# Patient Record
Sex: FEMALE | Race: WHITE | Employment: FULL TIME | URBAN - METROPOLITAN AREA
[De-identification: names, ages, dates, MRNs, and addresses within clinical notes are randomized per-mention and may not be internally consistent; named-entity substitution may affect disease eponyms.]

---

## 2017-08-02 ENCOUNTER — ALLSCRIPTS OFFICE VISIT (OUTPATIENT)
Dept: OTHER | Facility: OTHER | Age: 62
End: 2017-08-02

## 2017-08-02 DIAGNOSIS — Z12.31 ENCOUNTER FOR SCREENING MAMMOGRAM FOR MALIGNANT NEOPLASM OF BREAST: ICD-10-CM

## 2017-10-12 ENCOUNTER — GENERIC CONVERSION - ENCOUNTER (OUTPATIENT)
Dept: OTHER | Facility: OTHER | Age: 62
End: 2017-10-12

## 2018-01-12 VITALS — DIASTOLIC BLOOD PRESSURE: 60 MMHG | SYSTOLIC BLOOD PRESSURE: 118 MMHG

## 2018-05-02 ENCOUNTER — TELEPHONE (OUTPATIENT)
Dept: OBGYN CLINIC | Facility: MEDICAL CENTER | Age: 63
End: 2018-05-02

## 2018-05-02 NOTE — TELEPHONE ENCOUNTER
Pt  Requesting copy of documentation that notes when bladder prolapse was first discovered    Visit from 11/2015 located in old allscript system and sent to patients home per request

## 2018-08-13 RX ORDER — DIAZEPAM 10 MG/2ML
10 GEL RECTAL
COMMUNITY
End: 2019-08-15 | Stop reason: ALTCHOICE

## 2018-08-13 RX ORDER — AMOXICILLIN 250 MG
1 CAPSULE ORAL
COMMUNITY
Start: 2018-04-04 | End: 2019-08-15 | Stop reason: ALTCHOICE

## 2018-08-13 RX ORDER — HYDROCODONE BITARTRATE AND ACETAMINOPHEN 5; 325 MG/1; MG/1
1 TABLET ORAL EVERY 4 HOURS PRN
COMMUNITY
Start: 2018-04-18 | End: 2019-08-15 | Stop reason: ALTCHOICE

## 2018-08-13 RX ORDER — GABAPENTIN 100 MG/1
100 CAPSULE ORAL 4 TIMES DAILY
Refills: 0 | COMMUNITY
Start: 2018-06-27

## 2018-08-13 RX ORDER — ALENDRONATE SODIUM 70 MG/1
TABLET ORAL
Refills: 0 | COMMUNITY
Start: 2018-06-18

## 2018-08-13 RX ORDER — TROSPIUM CHLORIDE ER 60 MG/1
60 CAPSULE ORAL DAILY
Refills: 0 | COMMUNITY
Start: 2018-06-20 | End: 2019-08-15 | Stop reason: ALTCHOICE

## 2018-08-14 ENCOUNTER — ANNUAL EXAM (OUTPATIENT)
Dept: OBGYN CLINIC | Facility: MEDICAL CENTER | Age: 63
End: 2018-08-14
Payer: COMMERCIAL

## 2018-08-14 VITALS
WEIGHT: 126 LBS | SYSTOLIC BLOOD PRESSURE: 132 MMHG | DIASTOLIC BLOOD PRESSURE: 86 MMHG | HEIGHT: 62 IN | BODY MASS INDEX: 23.19 KG/M2

## 2018-08-14 DIAGNOSIS — Z01.419 ENCNTR FOR GYN EXAM (GENERAL) (ROUTINE) W/O ABN FINDINGS: Primary | ICD-10-CM

## 2018-08-14 DIAGNOSIS — Z12.31 ENCOUNTER FOR SCREENING MAMMOGRAM FOR MALIGNANT NEOPLASM OF BREAST: ICD-10-CM

## 2018-08-14 DIAGNOSIS — Z12.4 ENCOUNTER FOR PAP SMEAR OF CERVIX WITH HPV DNA COTESTING: ICD-10-CM

## 2018-08-14 PROBLEM — E78.00 HYPERCHOLESTEROLEMIA: Status: ACTIVE | Noted: 2017-12-19

## 2018-08-14 PROBLEM — K52.9 GASTROENTERITIS: Status: ACTIVE | Noted: 2018-04-15

## 2018-08-14 PROBLEM — N32.9 DISORDER OF BLADDER: Status: ACTIVE | Noted: 2017-12-19

## 2018-08-14 PROBLEM — G47.30 SLEEP APNEA: Status: ACTIVE | Noted: 2017-12-19

## 2018-08-14 PROBLEM — C73 PAPILLARY THYROID CARCINOMA (HCC): Status: ACTIVE | Noted: 2018-08-14

## 2018-08-14 PROBLEM — E16.2 HYPOGLYCEMIA: Status: ACTIVE | Noted: 2017-12-19

## 2018-08-14 PROBLEM — M81.0 OSTEOPOROSIS: Status: ACTIVE | Noted: 2017-08-01

## 2018-08-14 PROBLEM — Z91.09 ENVIRONMENTAL ALLERGIES: Status: ACTIVE | Noted: 2017-12-19

## 2018-08-14 PROBLEM — R10.2 PAIN, FEMALE PELVIC: Status: ACTIVE | Noted: 2017-08-02

## 2018-08-14 PROCEDURE — 87624 HPV HI-RISK TYP POOLED RSLT: CPT | Performed by: OBSTETRICS & GYNECOLOGY

## 2018-08-14 PROCEDURE — S0612 ANNUAL GYNECOLOGICAL EXAMINA: HCPCS | Performed by: OBSTETRICS & GYNECOLOGY

## 2018-08-14 PROCEDURE — G0145 SCR C/V CYTO,THINLAYER,RESCR: HCPCS | Performed by: OBSTETRICS & GYNECOLOGY

## 2018-08-14 RX ORDER — CIPROFLOXACIN 250 MG/1
250 TABLET, FILM COATED ORAL EVERY 12 HOURS SCHEDULED
COMMUNITY

## 2018-08-14 RX ORDER — ESTRADIOL 0.1 MG/G
2 CREAM VAGINAL 2 TIMES WEEKLY
COMMUNITY
End: 2020-08-27

## 2018-08-14 NOTE — PROGRESS NOTES
ASSESSMENT & PLAN: Adam Charles was seen today for gynecologic exam     Diagnoses and all orders for this visit:    Encntr for gyn exam (general) (routine) w/o abn findings    Encounter for Pap smear of cervix with HPV DNA cotesting  -     Cancel: Liquid-based pap, screening  -     Liquid-based pap, screening    Encounter for screening mammogram for malignant neoplasm of breast    Discussion/Summary; reviewed specifics of her surgery in [place], [place]  ( a referral from Dr Kael Ji); will heed pap and HPV)    1  Routine well woman exam done today  2  Pap and HPV:  The patient's pap is not up to date  Pap and cotesting was done today  Current ASCCP Guidelines reviewed  3   Mammogram was ordered  4  Colonoscopy is up to date  4  The following were reviewed in today's visit: breast self exam   5  F/u 1 year      CC:  Annual Gynecologic Examination    HPI: Melvi Mcfarland is a 59 y o  who presents for annual gynecologic examination  She has the following concerns:  Bladder to have Botox soon    Health Maintenance:    Patient describes her health as good  Patient does not have weight concerns  She exercises 7 days per week with walking  She doeswears her seatbelt routinely  She does perform regular monthly self breast exams  She does feel safe at home  Patients does not follow a  diet  Patient gets 4 servings of dairy or calcium rich foods a day      Last pap: 2015  Last mammogram: 2017  Last colonoscopy: 2016    Patient Active Problem List   Diagnosis    Pelvic pain    H/O vaginal surgery    Disorder of bladder    Environmental allergies    Gastroenteritis    Hypercholesterolemia    Hypoglycemia    Incontinence of urine in female    Osteoporosis    Osteopenia    Pain, female pelvic    Papillary thyroid carcinoma (HCC)    Postoperative hypothyroidism    Sleep apnea       Past Medical History:   Diagnosis Date    Cancer ([facility] [address] )     b cell lymphoma, thyroid cancer    CPAP (continuous positive airway pressure) dependence     Environmental allergies     Hyperlipidemia     Incontinence of urine in female     esolved :7/18/2016    Malignant neoplasm of thyroid gland ([address] )     Osteoporosis     Reactive hypoglycemia     Sleep apnea        Past Surgical History:   Procedure Laterality Date    DIAGNOSTIC LAPAROSCOPY      for endometriosis    KIDNEY STONE SURGERY      [place] REVISION VAGINAL GRAFT N/A 12/22/2016    Procedure: REVISION OF GRAFT; PUDENDAL NERVE RELEASE;  Surgeon: Bill Conde MD;  Location: [facility];  Service: UroGynecology           THYROIDECTOMY      TONSILLECTOMY         Past OB/Gyn History:    History of abnormal pap smears: No    Patient is currently sexually active  monogamous    Patient's family planning method is post menopausal status  Family History   Problem Relation Age of Onset    Breast cancer Other        Social History:  Social History     Social History    Marital status: /Civil Union     Spouse name: N/A    Number of children: N/A    Years of education: N/A     Occupational History    Not on file  Social History Main Topics    Smoking status: Never Smoker    Smokeless tobacco: Never Used    Alcohol use No    Drug use: No    Sexual activity: Not on file     Other Topics Concern    Not on file     Social History Narrative    No narrative on file     Presently lives with   Patient is currently employed       Allergies   Allergen Reactions    Aromatic Oils Headache    Aspirin     Dust Mite Mixed Allergen Ext  [Mite (D  Farinae)] Headache    Hydrocodone-Acetaminophen GI Intolerance and Nausea Only    Molds & Smuts Headache    Nicotiana Tabacum Headache    Tree Extract Headache    Erythromycin Other (See Comments)     Vomiting          Current Outpatient Prescriptions:     alendronate (FOSAMAX) 70 mg tablet, , Disp: , Rfl: 0    ciprofloxacin (CIPRO) 250 mg tablet, Take 250 mg by mouth every 12 (twelve) hours, Disp: , Rfl:     clonazePAM (KlonoPIN) 1 mg tablet, Take 1 mg by mouth daily at bedtime as needed for seizures, Disp: , Rfl:     Docusate Sodium (RA COL-RITE PO), Take 1 tablet by mouth daily, Disp: , Rfl:     estradiol (ESTRACE) 0 1 mg/g vaginal cream, Insert 2 g into the vagina 2 (two) times a week, Disp: , Rfl:     gabapentin (NEURONTIN) 100 mg capsule, 100 mg 4 (four) times a day  , Disp: , Rfl: 0    Levothyroxine Sodium 88 MCG CAPS, Take 88 mcg by mouth daily, Disp: , Rfl:     senna-docusate sodium (SENOKOT S) 8 6-50 mg per tablet, Take 1 tablet by mouth, Disp: , Rfl:     simvastatin (ZOCOR) 40 mg tablet, Take 40 mg by mouth daily at bedtime, Disp: , Rfl:     diazepam (DIASTAT) 10 mg, 10 mg, Disp: , Rfl:     HYDROcodone-acetaminophen (NORCO) 5-325 mg per tablet, Take 1 tablet by mouth every 4 (four) hours as needed, Disp: , Rfl:     nitrofurantoin (MACRODANTIN) 50 mg capsule, Take 50 mg by mouth daily at bedtime, Disp: , Rfl:     Ospemifene 60 MG TABS, Take 60 mg by mouth, Disp: , Rfl:     solifenacin (VESICARE) 10 MG tablet, Take 10 mg by mouth daily, Disp: , Rfl:     trospium (SANCTURA XR) 60 mg 24 hr capsule, Take 60 mg by mouth daily, Disp: , Rfl: 0    Review of Systems  Constitutional :no fever, feels well, no tiredness, no recent weight gain or loss  ENT: no ear ache, no loss of hearing, no nosebleeds or nasal discharge, no sore throat or hoarseness  Cardiovascular: no complaints of slow or fast heart beat, no chest pain, no palpitations, no leg claudication or lower extremity edema    Respiratory: no complaints of shortness of shortness of breath, no NASCIMENTO  Breasts:no complaints of breast pain, breast lump, or nipple discharge  Gastrointestinal: no complaints of abdominal pain, constipation, nausea, vomiting, or diarrhea or bloody stools  Genitourinary : no complaints of dysuria, incontinence, pelvic pain, no dysmenorrhea, vaginal discharge or abnormal vaginal bleeding and as noted in HPI   Musculoskeletal: no complaints of arthralgia, no myalgia, no joint swelling or stiffness, no limb pain or swelling  Integumentary: no complaints of skin rash or lesion, itching or dry skin  Neurological: no complaints of headache, no confusion, no numbness or tingling, no dizziness or fainting    Physical Exam:   /86 (BP Location: Right arm, Patient Position: Sitting, Cuff Size: Standard)   Ht 5' 2" (1 575 m)   Wt 57 2 kg (126 lb)   BMI 23 05 kg/m²     General:   appears stated age, cooperative, alert normal mood and affect   Psychiatric oriented to person, place and time  Mood and affect normal   Neck: normal, supple,trachea midline, no masses  Thyroid: normal, no thyromegaly   Heart: regular rate and rhythm, S1, S2 normal, no murmur, click, rub or gallop   Lungs: clear to auscultation bilaterally, no increased work of breathing or signs of respiratory distress   Breasts: normal, no dimpling or skin changes noted   Abdomen: soft, non-tender, without masses or organomegaly   Vulva: normal , no lesions   Vagina: normal , no lesions or dryness   Urethra: normal   Urethal meatus normal   Bladder Normal, soft, non-tender and no prolapse or masses appreciated   Cervix: normal, no palpable masses ; ec and c pap and HPV culture done   Uterus: normal , non-tender, not enlarged, no palpable masses   Adnexa: normal, non-tender without fullness or masses; hemoccult neg  Lymphatic Palpation of lymph nodes in neck, axilla, groin and/or other locations: no lymphadenopathy or masses noted   Skin Normal skin turgor and no rashes    Palpation of skin and subcutaneous tissue normal

## 2018-08-16 LAB
HPV HR 12 DNA CVX QL NAA+PROBE: NEGATIVE
HPV16 DNA CVX QL NAA+PROBE: NEGATIVE
HPV18 DNA CVX QL NAA+PROBE: NEGATIVE

## 2018-08-17 LAB
LAB AP GYN PRIMARY INTERPRETATION: NORMAL
Lab: NORMAL

## 2019-08-15 ENCOUNTER — ANNUAL EXAM (OUTPATIENT)
Dept: OBGYN CLINIC | Facility: MEDICAL CENTER | Age: 64
End: 2019-08-15
Payer: COMMERCIAL

## 2019-08-15 VITALS
HEIGHT: 62 IN | DIASTOLIC BLOOD PRESSURE: 80 MMHG | WEIGHT: 126.5 LBS | BODY MASS INDEX: 23.28 KG/M2 | SYSTOLIC BLOOD PRESSURE: 122 MMHG

## 2019-08-15 DIAGNOSIS — Z01.419 ENCNTR FOR GYN EXAM (GENERAL) (ROUTINE) W/O ABN FINDINGS: Primary | ICD-10-CM

## 2019-08-15 DIAGNOSIS — Z12.31 ENCOUNTER FOR SCREENING MAMMOGRAM FOR MALIGNANT NEOPLASM OF BREAST: ICD-10-CM

## 2019-08-15 PROCEDURE — S0612 ANNUAL GYNECOLOGICAL EXAMINA: HCPCS | Performed by: OBSTETRICS & GYNECOLOGY

## 2019-08-15 RX ORDER — METHENAMINE, SODIUM PHOSPHATE, MONOBASIC, METHYLENE BLUE, AND HYOSCYAMINE SULFATE 81.6; 40.8; 10.8; .12 MG/1; MG/1; MG/1; MG/1
TABLET, COATED ORAL
COMMUNITY
Start: 2018-12-26

## 2019-08-15 RX ORDER — LEVOTHYROXINE SODIUM 0.07 MG/1
TABLET ORAL
COMMUNITY
Start: 2019-08-15

## 2019-08-15 NOTE — PROGRESS NOTES
ASSESSMENT & PLAN: Idalia Cunningham was seen today for gynecologic exam     Diagnoses and all orders for this visit:    Encntr for gyn exam (general) (routine) w/o abn findings    Encounter for screening mammogram for malignant neoplasm of breast  -     Mammo screening bilateral w 3d & cad; Future    Discussion/Summary:  Patient here for yearly gyn preventive exam, accompanied in the room by her ,  Ronnie Penny  1   Routine well woman exam done today  2  Pap and HPV:  The patient's pap is up to date  Pap and cotesting was not done today  Current ASCCP Guidelines reviewed  3   Mammogram was ordered  4  Colonoscopy is up to date  4  The following were reviewed in today's visit: breast self exam, adequate intake of calcium and vitamin D, exercise and healthy diet  5  F/u 1 year  CC:  Annual Gynecologic Examination    HPI: Etelvina Howard is a 62 y o  who presents for annual gynecologic examination  She has the following concerns:  Bladder concerns being cared for by Dr Rhonda Schroeder  Health Maintenance:    Patient describes her health as good  Patient does not have weight concerns  She exercises 7 days per week with walking  She does wear her seatbelt routinely  She does perform regular monthly self breast exams  She does feel safe at home  Patients does not follow a  diet  Patient gets 5 servings of dairy or calcium rich foods a day      Last pap: 2017  Last mammogram: 2019  Last colonoscopy: unk own date    Patient Active Problem List   Diagnosis    Pelvic pain    H/O vaginal surgery    Disorder of bladder    Environmental allergies    Gastroenteritis    Hypercholesterolemia    Hypoglycemia    Incontinence of urine in female    Osteoporosis    Osteopenia    Pain, female pelvic    Papillary thyroid carcinoma (HCC)    Postoperative hypothyroidism    Sleep apnea       Past Medical History:   Diagnosis Date    Cancer ([facility] [address] )     b cell lymphoma, thyroid cancer    CPAP (continuous positive airway pressure) dependence     Environmental allergies     Hyperlipidemia     Incontinence of urine in female     esolved :7/18/2016    Malignant neoplasm of thyroid gland ([address] )     Osteoporosis     Reactive hypoglycemia     Sleep apnea        Past Surgical History:   Procedure Laterality Date    DIAGNOSTIC LAPAROSCOPY      for endometriosis    KIDNEY STONE SURGERY      [place] REVISION VAGINAL GRAFT N/A 12/22/2016    Procedure: REVISION OF GRAFT; PUDENDAL NERVE RELEASE;  Surgeon: Aspen Hoff MD;  Location: [facility];  Service: UroGynecology           THYROIDECTOMY      TONSILLECTOMY         Past OB/Gyn History:    History of abnormal pap smears: No    Patient is currently sexually active  monogamous   Patient's family planning method is post menopausal status      Family History   Problem Relation Age of Onset    Breast cancer Other        Social History:  Social History     Socioeconomic History    Marital status: /Civil Union     Spouse name: Not on file    Number of children: Not on file    Years of education: Not on file    Highest education level: Not on file   Occupational History    Not on file   Social Needs    Financial resource strain: Not on file    Food insecurity:     Worry: Not on file     Inability: Not on file    Transportation needs:     Medical: Not on file     Non-medical: Not on file   Tobacco Use    Smoking status: Never Smoker    Smokeless tobacco: Never Used   Substance and Sexual Activity    Alcohol use: No    Drug use: No    Sexual activity: Not on file   Lifestyle    Physical activity:     Days per week: Not on file     Minutes per session: Not on file    Stress: Not on file   Relationships    Social connections:     Talks on phone: Not on file     Gets together: Not on file     Attends Rastafarian service: Not on file     Active member of club or organization: Not on file     Attends meetings of clubs or organizations: Not on file     Relationship status: Not on file    Intimate partner violence:     Fear of current or ex partner: Not on file     Emotionally abused: Not on file     Physically abused: Not on file     Forced sexual activity: Not on file   Other Topics Concern    Not on file   Social History Narrative    Not on file     Presently lives with   Patient is currently retired   Allergies   Allergen Reactions    Aromatic Oils Headache    Aspirin     Doxycycline      Painful rash    Dust Mite Mixed Allergen Ext  [Mite (D  Farinae)] Headache    Hydrocodone-Acetaminophen GI Intolerance and Nausea Only    Molds & Smuts Headache    Nicotiana Tabacum Headache    Tree Extract Headache    Erythromycin Other (See Comments)     Vomiting          Current Outpatient Medications:     Methen-Hyosc-Meth Blue-Na Phos (UROGESIC-BLUE) 81 6 MG TABS, , Disp: , Rfl:     alendronate (FOSAMAX) 70 mg tablet, , Disp: , Rfl: 0    ciprofloxacin (CIPRO) 250 mg tablet, Take 250 mg by mouth every 12 (twelve) hours, Disp: , Rfl:     clonazePAM (KlonoPIN) 1 mg tablet, Take 1 mg by mouth daily at bedtime as needed for seizures, Disp: , Rfl:     Docusate Sodium (RA COL-RITE PO), Take 1 tablet by mouth daily, Disp: , Rfl:     estradiol (ESTRACE) 0 1 mg/g vaginal cream, Insert 2 g into the vagina 2 (two) times a week, Disp: , Rfl:     gabapentin (NEURONTIN) 100 mg capsule, 100 mg 4 (four) times a day  , Disp: , Rfl: 0    levothyroxine 75 mcg tablet, , Disp: , Rfl:     simvastatin (ZOCOR) 40 mg tablet, Take 40 mg by mouth daily at bedtime, Disp: , Rfl:     Review of Systems  Constitutional :no fever, feels well, no tiredness, no recent weight gain or loss  ENT: no ear ache, no loss of hearing, no nosebleeds or nasal discharge, no sore throat or hoarseness  Cardiovascular: no complaints of slow or fast heart beat, no chest pain, no palpitations, no leg claudication or lower extremity edema    Respiratory: no complaints of shortness of shortness of breath, no NASCIMENTO  Breasts:no complaints of breast pain, breast lump, or nipple discharge  Gastrointestinal: no complaints of abdominal pain, constipation, nausea, vomiting, or diarrhea or bloody stools  Genitourinary : no complaints of dysuria, incontinence, pelvic pain, no dysmenorrhea, vaginal discharge or abnormal vaginal bleeding and as noted in HPI  Musculoskeletal: no complaints of arthralgia, no myalgia, no joint swelling or stiffness, no limb pain or swelling  Integumentary: no complaints of skin rash or lesion, itching or dry skin  Neurological: no complaints of headache, no confusion, no numbness or tingling, no dizziness or fainting    Physical Exam:     /80   Ht 5' 2" (1 575 m)   Wt 57 4 kg (126 lb 8 oz)   LMP  (Approximate) Comment: 1994  Breastfeeding? No   BMI 23 14 kg/m²     General: appears stated age, cooperative, alert normal mood and affect   Psychiatric oriented to person, place and time  Mood and affect normal   Neck: normal, supple,trachea midline, no masses  Thyroid: normal, no thyromegaly   Heart: regular rate and rhythm, S1, S2 normal, no murmur, click, rub or gallop   Lungs: clear to auscultation bilaterally, no increased work of breathing or signs of respiratory distress   Breasts: normal, no dimpling or skin changes noted   Abdomen: soft, non-tender, without masses or organomegaly   Vulva: normal , no lesions   Vagina: normal , no lesions or dryness   Urethra: normal   Urethal meatus normal   Bladder Normal, soft, non-tender and no prolapse or masses appreciated   Cervix: normal, no palpable masses    Uterus: normal , non-tender, not enlarged, no palpable masses   Adnexa: normal, non-tender without fullness or masses; hemoccult neg  Lymphatic Palpation of lymph nodes in neck, axilla, groin and/or other locations: no lymphadenopathy or masses noted   Skin Normal skin turgor and no rashes    Palpation of skin and subcutaneous tissue normal

## 2019-08-28 ENCOUNTER — TELEPHONE (OUTPATIENT)
Dept: OBGYN CLINIC | Facility: MEDICAL CENTER | Age: 64
End: 2019-08-28

## 2020-08-27 ENCOUNTER — ANNUAL EXAM (OUTPATIENT)
Dept: OBGYN CLINIC | Facility: MEDICAL CENTER | Age: 65
End: 2020-08-27
Payer: COMMERCIAL

## 2020-08-27 VITALS
DIASTOLIC BLOOD PRESSURE: 60 MMHG | BODY MASS INDEX: 22.08 KG/M2 | HEIGHT: 62 IN | SYSTOLIC BLOOD PRESSURE: 118 MMHG | WEIGHT: 120 LBS | TEMPERATURE: 98.1 F

## 2020-08-27 DIAGNOSIS — Z01.419 ENCNTR FOR GYN EXAM (GENERAL) (ROUTINE) W/O ABN FINDINGS: Primary | ICD-10-CM

## 2020-08-27 DIAGNOSIS — Z12.31 ENCOUNTER FOR SCREENING MAMMOGRAM FOR MALIGNANT NEOPLASM OF BREAST: ICD-10-CM

## 2020-08-27 PROCEDURE — 99396 PREV VISIT EST AGE 40-64: CPT | Performed by: OBSTETRICS & GYNECOLOGY

## 2020-08-27 RX ORDER — PSEUDOEPHEDRINE HYDROCHLORIDE 30 MG/1
30 TABLET ORAL EVERY 4 HOURS PRN
COMMUNITY

## 2020-08-27 NOTE — PROGRESS NOTES
ASSESSMENT & PLAN: Mayuri Hooker was seen today for gynecologic exam     Diagnoses and all orders for this visit:    Encntr for gyn exam (general) (routine) w/o abn findings    Encounter for screening mammogram for malignant neoplasm of breast  -     Mammo screening bilateral w 3d & cad; Future    Discussion/Summary:  Patient here for yearly gyn preventive exam, accompanied in the room by her , Blanca Milian  1   Routine well woman exam done today  2  Pap and HPV:  The patient's pap is up to date  Pap and cotesting was not done today  Current ASCCP Guidelines reviewed  3   Mammogram was ordered  4  Colorectal cancer screening is up to date  filled form for   Cologuard  4  The following were reviewed in today's visit: breast self exam, adequate intake of calcium and vitamin D, exercise and healthy diet  5  F/u 1 year  CC:  Annual Gynecologic Examination    HPI: Erik Sood is a 60 y o  who presents for annual gynecologic examination  She has the following concerns:  Some right sided pelvic discomfort believed to be from the mesh of urogyn surgery    Health Maintenance:    Patient describes her health as good  Patient does not have weight concerns  She exercises 7 days per week with walking  She does wear her seatbelt routinely  She does perform regular monthly self breast exams  She does feel safe at home  Patients does not follow a  diet  Patient gets 5 servings of dairy or calcium rich foods a day      Last pap: 2018  Last mammogram: 2018  Last colorectal cancer screening: unknown date    Patient Active Problem List   Diagnosis    Pelvic pain    H/O vaginal surgery    Disorder of bladder    Environmental allergies    Gastroenteritis    Hypercholesterolemia    Hypoglycemia    Incontinence of urine in female    Osteoporosis    Osteopenia    Pain, female pelvic    Papillary thyroid carcinoma (HCC)    Postoperative hypothyroidism    Sleep apnea       Past Medical History: Diagnosis Date    Cancer [facility])     b cell lymphoma, thyroid cancer    CPAP (continuous positive airway pressure) dependence     Environmental allergies     Hyperlipidemia     Incontinence of urine in female     esolved :7/18/2016    Malignant neoplasm of thyroid gland ([address] )     Osteoporosis     Reactive hypoglycemia     Sleep apnea        Past Surgical History:   Procedure Laterality Date    DIAGNOSTIC LAPAROSCOPY      for endometriosis    KIDNEY STONE SURGERY      [place] REVISION VAGINAL GRAFT N/A 12/22/2016    Procedure: REVISION OF GRAFT; PUDENDAL NERVE RELEASE;  Surgeon: Luanne Carter MD;  [facility];  Service: UroGynecology           THYROIDECTOMY      TONSILLECTOMY         Past OB/Gyn History:    History of abnormal pap smears: No    Patient is currently sexually active  monogamous    Patient's family planning method is post menopausal status      Family History   Problem Relation Age of Onset    Breast cancer Other        Social History:  Social History     Socioeconomic History    Marital status: /Civil Union     Spouse name: Not on file    Number of children: Not on file    Years of education: Not on file    Highest education level: Not on file   Occupational History    Not on file   Social Needs    Financial resource strain: Not on file    Food insecurity     Worry: Not on file     Inability: Not on file   [employer] needs     Medical: Not on file     Non-medical: Not on file   Tobacco Use    Smoking status: Never Smoker    Smokeless tobacco: Never Used   Substance and Sexual Activity    Alcohol use: No    Drug use: No    Sexual activity: Not on file   Lifestyle    Physical activity     Days per week: Not on file     Minutes per session: Not on file    Stress: Not on file   Relationships    Social connections     Talks on phone: Not on file     Gets together: Not on file     Attends Roman [employer] service: Not on file     Active member of club or organization: Not on file     Attends meetings of clubs or organizations: Not on file     Relationship status: Not on file    Intimate partner violence     Fear of current or ex partner: Not on file     Emotionally abused: Not on file     Physically abused: Not on file     Forced sexual activity: Not on file   Other Topics Concern    Not on file   Social History Narrative    Not on file     Presently lives with   Patient is currently unemployed       Allergies   Allergen Reactions    Aromatic Oils Headache    Aspirin     Doxycycline      Painful rash    Dust Mite Mixed Allergen Ext  [Mite (D  Farinae)] Headache    Hydrocodone-Acetaminophen GI Intolerance and Nausea Only    Molds & Smuts Headache    Nicotiana Tabacum Headache    Tree Extract Headache    Erythromycin Other (See Comments)     Vomiting          Current Outpatient Medications:     alendronate (FOSAMAX) 70 mg tablet, , Disp: , Rfl: 0    ciprofloxacin (CIPRO) 250 mg tablet, Take 250 mg by mouth every 12 (twelve) hours, Disp: , Rfl:     clonazePAM (KlonoPIN) 1 mg tablet, Take 1 mg by mouth daily at bedtime as needed for seizures, Disp: , Rfl:     Docusate Sodium (RA COL-RITE PO), Take 1 tablet by mouth daily, Disp: , Rfl:     gabapentin (NEURONTIN) 100 mg capsule, 100 mg 4 (four) times a day  , Disp: , Rfl: 0    levothyroxine 75 mcg tablet, , Disp: , Rfl:     Methen-Hyosc-Meth Blue-Na Phos (UROGESIC-BLUE) 81 6 MG TABS, , Disp: , Rfl:     NON FORMULARY, Take by mouth, Disp: , Rfl:     Polyethylene Glycol 3350 (MIRALAX PO), Take by mouth, Disp: , Rfl:     pseudoephedrine (Sudafed) 30 mg tablet, Take 30 mg by mouth every 4 (four) hours as needed, Disp: , Rfl:     simvastatin (ZOCOR) 40 mg tablet, Take 40 mg by mouth daily at bedtime, Disp: , Rfl:     Review of Systems  Constitutional :no fever, feels well, no tiredness, no recent weight gain or loss  ENT: no ear ache, no loss of hearing, no nosebleeds or nasal discharge, no sore throat or hoarseness  Cardiovascular: no complaints of slow or fast heart beat, no chest pain, no palpitations, no leg claudication or lower extremity edema  Respiratory: no complaints of shortness of shortness of breath, no NASCIMENTO  Breasts:no complaints of breast pain, breast lump, or nipple discharge  Gastrointestinal: no complaints of abdominal pain, constipation, nausea, vomiting, or diarrhea or bloody stools  Genitourinary : no complaints of dysuria, incontinence, pelvic pain, no dysmenorrhea, vaginal discharge or abnormal vaginal bleeding and as noted in HPI  Musculoskeletal: no complaints of arthralgia, no myalgia, no joint swelling or stiffness, no limb pain or swelling  Integumentary: no complaints of skin rash or lesion, itching or dry skin  Neurological: no complaints of headache, no confusion, no numbness or tingling, no dizziness or fainting    Physical Exam:     /60   Temp 98 1 °F (36 7 °C) (Temporal)   Ht 5' 2" (1 575 m)   Wt 54 4 kg (120 lb)   Breastfeeding No   BMI 21 95 kg/m²     General: appears stated age, cooperative, alert normal mood and affect   Psychiatric oriented to person, place and time  Mood and affect normal   Neck: normal, supple,trachea midline, no masses  Thyroid: normal, no thyromegaly   Heart: regular rate and rhythm, S1, S2 normal, no murmur, click, rub or gallop   Lungs: clear to auscultation bilaterally, no increased work of breathing or signs of respiratory distress   Breasts: normal, no dimpling or skin changes noted   Abdomen: soft, non-tender, without masses or organomegaly   Vulva: normal , no lesions   Vagina: normal , no lesions or atrophy   Urethra: normal   Urethal meatus normal   Bladder Normal, soft, non-tender and no prolapse or masses appreciated   Cervix: normal, no palpable masses    Uterus: normal , non-tender, not enlarged, no palpable masses   Adnexa: normal, non-tender without fullness or masses; hemoccult neg     Lymphatic Palpation of lymph nodes in neck, axilla, groin and/or other locations: no lymphadenopathy or masses noted   Skin Normal skin turgor and no rashes    Palpation of skin and subcutaneous tissue normal

## 2020-09-22 ENCOUNTER — TELEPHONE (OUTPATIENT)
Dept: OBGYN CLINIC | Facility: MEDICAL CENTER | Age: 65
End: 2020-09-22

## 2020-09-22 NOTE — TELEPHONE ENCOUNTER
I called patient and spoke with her and , Mari Mcclellan about results of recent Cologuard  Informed them about future visits

## 2021-02-01 DIAGNOSIS — Z12.31 ENCOUNTER FOR SCREENING MAMMOGRAM FOR MALIGNANT NEOPLASM OF BREAST: ICD-10-CM
